# Patient Record
Sex: MALE | Race: WHITE | NOT HISPANIC OR LATINO | Employment: FULL TIME | URBAN - METROPOLITAN AREA
[De-identification: names, ages, dates, MRNs, and addresses within clinical notes are randomized per-mention and may not be internally consistent; named-entity substitution may affect disease eponyms.]

---

## 2017-08-24 ENCOUNTER — OFFICE VISIT (OUTPATIENT)
Dept: NEUROLOGY | Facility: CLINIC | Age: 52
End: 2017-08-24
Payer: COMMERCIAL

## 2017-08-24 VITALS
SYSTOLIC BLOOD PRESSURE: 131 MMHG | BODY MASS INDEX: 35.92 KG/M2 | DIASTOLIC BLOOD PRESSURE: 78 MMHG | WEIGHT: 237 LBS | HEART RATE: 74 BPM | HEIGHT: 68 IN

## 2017-08-24 DIAGNOSIS — G03.9 PACHYMENINGITIS: Primary | ICD-10-CM

## 2017-08-24 PROCEDURE — 99205 OFFICE O/P NEW HI 60 MIN: CPT | Mod: S$GLB,,, | Performed by: PSYCHIATRY & NEUROLOGY

## 2017-08-24 PROCEDURE — 99999 PR PBB SHADOW E&M-NEW PATIENT-LVL II: CPT | Mod: PBBFAC,,, | Performed by: PSYCHIATRY & NEUROLOGY

## 2017-08-24 PROCEDURE — 3008F BODY MASS INDEX DOCD: CPT | Mod: S$GLB,,, | Performed by: PSYCHIATRY & NEUROLOGY

## 2017-08-24 RX ORDER — GLYBURIDE-METFORMIN HYDROCHLORIDE 2.5; 5 MG/1; MG/1
TABLET ORAL
COMMUNITY
Start: 2017-07-03 | End: 2020-09-23

## 2017-08-24 RX ORDER — METOPROLOL TARTRATE 25 MG/1
25 TABLET, FILM COATED ORAL 2 TIMES DAILY
COMMUNITY
Start: 2017-08-09

## 2017-08-24 RX ORDER — ROSUVASTATIN CALCIUM 20 MG/1
20 TABLET, COATED ORAL NIGHTLY
COMMUNITY
Start: 2017-08-11

## 2017-08-24 RX ORDER — TESTOSTERONE CYPIONATE 200 MG/ML
200 INJECTION, SOLUTION INTRAMUSCULAR
COMMUNITY
End: 2021-10-14

## 2017-08-24 RX ORDER — WARFARIN SODIUM 5 MG/1
5 TABLET ORAL
COMMUNITY
Start: 2017-07-03

## 2017-08-24 RX ORDER — ASPIRIN 81 MG/1
81 TABLET ORAL DAILY
COMMUNITY

## 2017-08-24 RX ORDER — RAMIPRIL 1.25 MG/1
1.25 CAPSULE ORAL NIGHTLY
COMMUNITY
Start: 2017-08-08

## 2017-08-24 RX ORDER — GABAPENTIN 300 MG/1
600 CAPSULE ORAL NIGHTLY
COMMUNITY
Start: 2017-08-18

## 2017-08-24 RX ORDER — MONTELUKAST SODIUM 10 MG/1
10 TABLET ORAL DAILY
COMMUNITY
Start: 2017-08-04

## 2017-08-24 NOTE — LETTER
August 24, 2017      Brown Ochoa MD  [address]  [facility] [place] [address]           [facility]  [address]  [facility] [address]  Phone: [phone]  Fax: [phone]          Patient: Pedro Petty   MR Number: [id number]   YOB: 1965   Date of Visit: 8/24/2017       Dear Dr. Brown Ochoa:    Thank you for referring Pedro Petty to me for evaluation. Attached you will find relevant portions of my assessment and plan of care.    If you have questions, please do not hesitate to call me. I look forward to following Pedro Petty along with you.    Sincerely,    Ahsan Bowens MD    Enclosure  CC:  No Recipients    If you would like to receive this communication electronically, please contact [email or web address] or [phone] to request more information on [email or web address] Link access.    For providers and/or their staff who would like to refer a patient to [facility], please contact us through our one-stop-shop provider referral line, [facility], at [phone].    If you feel you have received this communication in error or would no longer like to receive these types of communications, please e-mail [email or web address]

## 2017-08-24 NOTE — PROGRESS NOTES
[facility] - NEUROLOGY  [facility]    Date: August 24, 2017   Patient Name: Pedro Petty   MRN: [id number]   PCP: Rafi Corado  Referring Provider: Brown Ochoa MD    Assessment:      This is Pedro Petty, 51 y.o. male on coumadin due to aortic valve replacement in 2010 with several days of now resolved headache and confusion during prolonged stay in [place] with MRI brain showing pachymeningeal enhancement.  Currently without deficit or complaint.  Diagnostic considerations include sarcoid, CNS lymphoma, leptomeningeal carcinomatosis, fungal or TB infection.  Needs LP for further evaluation.     Plan:      -  LP scheduled, advised to stop coumadin 5 days prior and resume after procedure  -  Labs today  -  Follow up when procedure complete       I discussed side effects of the medications. I asked the patient to stop the medication if he notices serious adverse effects as we discussed and to seek immediate medical attention at an ER.     Ahsan Bowens MD  [facility]   Department of Neurology    Subjective:      HPI:   Mr. Pedro Petty is a 51 y.o. 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  In mid July he noted mild headache and blurry vision for 5-6 days and was then noted to have mild confusion when calling his wife (did not recall phone number).  He was briefly hospitalized in [place] with abnormal CT head, BP noted to be 179/110 and discharged with instructions to follow up once home.  Since shortly after discharge from hospital in [place] he has felt well with no headaches, vision changes, weakness, confusions.  He has never smoked, does not get PPD for work.  Follows with Dr. Brown Ochoa for cardiology in [place].    PAST MEDICAL HISTORY:  No past medical history on file.    PAST SURGICAL HISTORY:  No past surgical history on file.    CURRENT  "MEDS:  Current Outpatient Prescriptions   Medication Sig Dispense Refill    aspirin (ECOTRIN) 81 MG EC tablet Take 81 mg by mouth once daily.      gabapentin (NEURONTIN) 300 MG capsule       glyBURIDE-metformin 2.5-500 mg (GLUCOVANCE) 2.5-500 mg per tablet       metoprolol tartrate (LOPRESSOR) 25 MG tablet       montelukast (SINGULAIR) 10 mg tablet       ramipril (ALTACE) 1.25 MG capsule       rosuvastatin (CRESTOR) 20 MG tablet       testosterone cypionate (DEPO-TESTOSTERONE) 200 mg/mL injection Inject 200 mg into the muscle every 14 (fourteen) days.      warfarin (COUMADIN) 5 MG tablet        No current facility-administered medications for this visit.        ALLERGIES:  Review of patient's allergies indicates:  No Known Allergies    FAMILY HISTORY:  No family history on file.    SOCIAL HISTORY:  Social History   Substance Use Topics    Smoking status: Never Smoker    Smokeless tobacco: Never Used    Alcohol use Not on file       Review of Systems:  12 review of systems is negative except for the symptoms mentioned in HPI.        Objective:     Vitals:    08/24/17 1024   BP: 131/78   Pulse: 74   Weight: 107.5 kg (236 lb 15.9 oz)   Height: 5' 8" (1.727 m)       General: NAD, well nourished   Eyes: no tearing, discharge, no erythema   ENT: moist mucous membranes of the oral cavity, nares patent    Neck: Supple, full range of motion  Cardiovascular: Warm and well perfused, pulses equal and symmetrical  Lungs: Normal work of breathing, normal chest wall excursions  Skin: No rash, lesions, or breakdown on exposed skin  Psychiatry: Mood and affect are appropriate   Abdomen: soft, non tender, non distended  Extremeties: No cyanosis, clubbing or edema.    Neurological   MENTAL STATUS: Alert and oriented to person, place, and time. Attention and concentration within normal limits. Speech without dysarthria, able to name and repeat without difficulty. Recent and remote memory within normal limits   CRANIAL " NERVES: Visual fields intact. PERRL. EOMI. Facial sensation intact. Face symmetrical. Hearing grossly intact. Full shoulder shrug bilaterally. Tongue protrudes midline   SENSORY: Sensation is intact to light touch throughout.  Negative Romberg.   MOTOR: Normal bulk and tone. No pronator drift.  5/5 deltoid, biceps, triceps, interosseous, hand  bilaterally. 5/5 iliopsoas, knee extension/flexion, foot dorsi/plantarflexion bilaterally.    REFLEXES: Ankles mute, remainder symmetric and 2+ throughout. Toes down going bilaterally.   CEREBELLAR/COORDINATION/GAIT: Gait steady with normal arm swing and stride length.  Heel to shin intact. Finger to nose intact. Normal rapid alternating movements.

## 2017-08-24 NOTE — LETTER
August 24, 2017    Pedro Petty  [address] [place] [address]             [facility]  [address]  [place] [address]  Phone: [phone]  Fax: [phone] To whom it may concern,    I saw Mr. Pedro Petty on August 23, 2017 in neurology clinic for evaluation regarding recent head imaging.  He will require additional testing which is scheduled for August 31, 2017.  He may return to work with full duties on September 4, 2017.  Please do not hesitate to contact me for any further information.    Thank you,        Ahsan Bowens M.D.

## 2017-08-25 ENCOUNTER — PATIENT MESSAGE (OUTPATIENT)
Dept: NEUROLOGY | Facility: CLINIC | Age: 52
End: 2017-08-25

## 2017-08-25 DIAGNOSIS — F41.9 ANXIETY: Primary | ICD-10-CM

## 2017-08-25 RX ORDER — CLONAZEPAM 1 MG/1
1 TABLET ORAL 2 TIMES DAILY PRN
Qty: 10 TABLET | Refills: 0 | Status: SHIPPED | OUTPATIENT
Start: 2017-08-25 | End: 2021-03-17

## 2017-08-31 ENCOUNTER — TELEPHONE (OUTPATIENT)
Dept: NEUROLOGY | Facility: CLINIC | Age: 52
End: 2017-08-31

## 2017-08-31 ENCOUNTER — HOSPITAL ENCOUNTER (OUTPATIENT)
Dept: RADIOLOGY | Facility: HOSPITAL | Age: 52
Discharge: HOME | End: 2017-08-31
Attending: PSYCHIATRY & NEUROLOGY
Payer: COMMERCIAL

## 2017-08-31 DIAGNOSIS — G03.9 PACHYMENINGITIS: ICD-10-CM

## 2017-08-31 LAB
CLARITY CSF: CLEAR
COLOR CSF: COLORLESS
GLUCOSE CSF-MCNC: 75 MG/DL
LYMPHOCYTES NFR CSF MANUAL: 74 %
MONOS+MACROS NFR CSF MANUAL: 23 %
NEUTROPHILS NFR CSF MANUAL: 3 %
PROT CSF-MCNC: 51 MG/DL
RBC # CSF: 50 /CU MM
SPECIMEN VOL CSF: 2 ML
WBC # CSF: 3 /CU MM

## 2017-08-31 PROCEDURE — 88185 FLOWCYTOMETRY/TC ADD-ON: CPT | Performed by: PATHOLOGY

## 2017-08-31 PROCEDURE — 86403 PARTICLE AGGLUT ANTBDY SCRN: CPT

## 2017-08-31 PROCEDURE — 87116 MYCOBACTERIA CULTURE: CPT

## 2017-08-31 PROCEDURE — 86592 SYPHILIS TEST NON-TREP QUAL: CPT

## 2017-08-31 PROCEDURE — 82945 GLUCOSE OTHER FLUID: CPT

## 2017-08-31 PROCEDURE — 87205 SMEAR GRAM STAIN: CPT

## 2017-08-31 PROCEDURE — 77003 FLUOROGUIDE FOR SPINE INJECT: CPT | Mod: 26,,, | Performed by: RADIOLOGY

## 2017-08-31 PROCEDURE — 87070 CULTURE OTHR SPECIMN AEROBIC: CPT

## 2017-08-31 PROCEDURE — 87015 SPECIMEN INFECT AGNT CONCNTJ: CPT

## 2017-08-31 PROCEDURE — 86738 MYCOPLASMA ANTIBODY: CPT

## 2017-08-31 PROCEDURE — 62270 DX LMBR SPI PNXR: CPT | Mod: ,,, | Performed by: RADIOLOGY

## 2017-08-31 PROCEDURE — 87102 FUNGUS ISOLATION CULTURE: CPT

## 2017-08-31 PROCEDURE — 89051 BODY FLUID CELL COUNT: CPT

## 2017-08-31 PROCEDURE — 88189 FLOWCYTOMETRY/READ 16 & >: CPT | Mod: ,,, | Performed by: PATHOLOGY

## 2017-08-31 PROCEDURE — 82164 ANGIOTENSIN I ENZYME TEST: CPT

## 2017-08-31 PROCEDURE — 86698 HISTOPLASMA ANTIBODY: CPT

## 2017-08-31 PROCEDURE — 84157 ASSAY OF PROTEIN OTHER: CPT

## 2017-08-31 PROCEDURE — 62270 DX LMBR SPI PNXR: CPT | Mod: TC

## 2017-08-31 PROCEDURE — 88184 FLOWCYTOMETRY/ TC 1 MARKER: CPT | Performed by: PATHOLOGY

## 2017-08-31 NOTE — TELEPHONE ENCOUNTER
Patient wife is  Concern about him going back to work on  Monday but is  Worried about not  Getting the  Full  Results in on time She  Ask for a call back  From  You  Because she would like to what  The next steps would be

## 2017-08-31 NOTE — PROCEDURES
Radiology Post-Procedure Note    Pre Op Diagnosis: HA and vertigo     Post Op Diagnosis: Same    Procedure: lumbar puncture    Procedure performed by: Ross NIETO, Rona Aguirre and Kari Landeros    Written Informed Consent Obtained: Yes    Specimen Removed: 9 mL CSF    Estimated Blood Loss: Minimal    Findings: Following written informed consent and sterile prep and drape, a 22 gauge spinal needle was inserted at L2 - L3 intralaminar space under fluoroscopic surveillance.  9 mL clear CSF removed and sent to the lab for further analysis.  There were no complications.    Patient tolerated procedure well.    Kari Landeros  Diagnostic and interventional radiology  PGY- II  [phone]

## 2017-09-01 ENCOUNTER — PATIENT MESSAGE (OUTPATIENT)
Dept: NEUROLOGY | Facility: CLINIC | Age: 52
End: 2017-09-01

## 2017-09-01 LAB
CRYPTOC AG CSF QL LA: NEGATIVE
FLOW CYTOMETRY ANTIBODIES ANALYZED - CSF: NORMAL
FLOW CYTOMETRY COMMENT - CSF: NORMAL
FLOW CYTOMETRY INTERPRETATION - CSF: NORMAL

## 2017-09-01 NOTE — TELEPHONE ENCOUNTER
----- Message from Ameya Carroll sent at 9/1/2017  4:50 PM CDT -----  Contact: Zenaida (wife) @ [phone]  Zenaida is calling to see if the doctor was able to write a letter releasing the pt back to work. 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

## 2017-09-02 LAB
H CAPSUL AB CSF QL ID: NEGATIVE
H CAPSUL MYC AB TITR CSF CF: NEGATIVE {TITER}
H CAPSUL YST AB TITR CSF CF: NEGATIVE {TITER}

## 2017-09-03 LAB — ACE CSF-CCNC: 0.8 U/L (ref 0–2.5)

## 2017-09-05 LAB
CMV SPEC QL SHELL VIAL CULT: NO GROWTH
GRAM STN SPEC: NORMAL

## 2017-09-06 LAB
Lab: NORMAL
VDRL CSF QL: NORMAL

## 2017-10-02 ENCOUNTER — OFFICE VISIT (OUTPATIENT)
Dept: NEUROLOGY | Facility: CLINIC | Age: 52
End: 2017-10-02
Payer: COMMERCIAL

## 2017-10-02 ENCOUNTER — PATIENT MESSAGE (OUTPATIENT)
Dept: NEUROLOGY | Facility: CLINIC | Age: 52
End: 2017-10-02

## 2017-10-02 VITALS
BODY MASS INDEX: 35.92 KG/M2 | SYSTOLIC BLOOD PRESSURE: 101 MMHG | HEART RATE: 62 BPM | DIASTOLIC BLOOD PRESSURE: 75 MMHG | HEIGHT: 68 IN | WEIGHT: 237 LBS

## 2017-10-02 DIAGNOSIS — G03.9 PACHYMENINGITIS: Primary | ICD-10-CM

## 2017-10-02 LAB — FUNGUS SPEC CULT: NORMAL

## 2017-10-02 PROCEDURE — 3008F BODY MASS INDEX DOCD: CPT | Mod: S$GLB,,, | Performed by: PSYCHIATRY & NEUROLOGY

## 2017-10-02 PROCEDURE — 99999 PR PBB SHADOW E&M-EST. PATIENT-LVL III: CPT | Mod: PBBFAC,,, | Performed by: PSYCHIATRY & NEUROLOGY

## 2017-10-02 PROCEDURE — 99213 OFFICE O/P EST LOW 20 MIN: CPT | Mod: S$GLB,,, | Performed by: PSYCHIATRY & NEUROLOGY

## 2017-10-02 NOTE — PROGRESS NOTES
[facility] - NEUROLOGY  [facility]    Date: October 2, 2017   Patient Name: Pedro Petty   MRN: [id number]   PCP: Rafi Corado  Referring Provider: Rafi Harris.,*    Assessment:      This is Pedro Petty, 51 y.o. male on coumadin due to aortic valve replacement in 2010 with several days of now resolved headache and briefconfusion during prolonged stay in [place] with MRI brain showing pachymeningeal enhancement, CSF without abnormalities to explain this.  He will need repeat imaging to determine if there is continuing enhancement.     Plan:      -  MRI brain with and without       I discussed side effects of the medications. I asked the patient to stop the medication if he notices serious adverse effects as we discussed and to seek immediate medical attention at an ER.     Ahsan Bowens MD  [facility]   Department of Neurology    Subjective:   Patient continues to feel well, returned to [place] for work without any further headache, confusion    HPI 8/2017:   Mr. Pedro Petty is a 51 y.o. 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  In mid July he noted mild headache and blurry vision for 5-6 days and was then noted to have mild confusion when calling his wife (did not recall phone number).  He was briefly hospitalized in [place] with abnormal CT head, BP noted to be 179/110 and discharged with instructions to follow up once home.  Since shortly after discharge from hospital in [place] he has felt well with no headaches, vision changes, weakness, confusions.  He has never smoked, does not get PPD for work.  Follows with Dr. Brown Ochoa for cardiology in [place].    PAST MEDICAL HISTORY:  No past medical history on file.    PAST SURGICAL HISTORY:  No past surgical history on file.    CURRENT MEDS:  Current Outpatient Prescriptions  "  Medication Sig Dispense Refill    aspirin (ECOTRIN) 81 MG EC tablet Take 81 mg by mouth once daily.      clonazePAM (KLONOPIN) 1 MG tablet Take 1 tablet (1 mg total) by mouth 2 (two) times daily as needed for Anxiety. 10 tablet 0    gabapentin (NEURONTIN) 300 MG capsule       glyBURIDE-metformin 2.5-500 mg (GLUCOVANCE) 2.5-500 mg per tablet       metoprolol tartrate (LOPRESSOR) 25 MG tablet       montelukast (SINGULAIR) 10 mg tablet       ramipril (ALTACE) 1.25 MG capsule       rosuvastatin (CRESTOR) 20 MG tablet       testosterone cypionate (DEPO-TESTOSTERONE) 200 mg/mL injection Inject 200 mg into the muscle every 14 (fourteen) days.      warfarin (COUMADIN) 5 MG tablet        No current facility-administered medications for this visit.        ALLERGIES:  Review of patient's allergies indicates:  No Known Allergies    FAMILY HISTORY:  No family history on file.    SOCIAL HISTORY:  Social History   Substance Use Topics    Smoking status: Never Smoker    Smokeless tobacco: Never Used    Alcohol use Not on file       Review of Systems:  12 review of systems is negative except for the symptoms mentioned in HPI.        Objective:     Vitals:    10/02/17 1033   BP: 101/75   Pulse: 62   Weight: 107.5 kg (236 lb 15.9 oz)   Height: 5' 8" (1.727 m)       General: NAD, well nourished   Eyes: no tearing, discharge, no erythema   ENT: moist mucous membranes of the oral cavity, nares patent    Neck: Supple, full range of motion  Cardiovascular: Warm and well perfused, pulses equal and symmetrical  Lungs: Normal work of breathing, normal chest wall excursions  Skin: No rash, lesions, or breakdown on exposed skin  Psychiatry: Mood and affect are appropriate   Abdomen: soft, non tender, non distended  Extremeties: No cyanosis, clubbing or edema.    Neurological   MENTAL STATUS: Alert and oriented to person, place, and time. Attention and concentration within normal limits. Speech without dysarthria, able to name and " repeat without difficulty. Recent and remote memory within normal limits   CRANIAL NERVES: Visual fields intact. PERRL. EOMI. Facial sensation intact. Face symmetrical. Hearing grossly intact. Full shoulder shrug bilaterally. Tongue protrudes midline   SENSORY: Sensation is intact to light touch throughout.  Negative Romberg.   MOTOR: Normal bulk and tone. No pronator drift.  5/5 deltoid, biceps, triceps, interosseous, hand  bilaterally. 5/5 iliopsoas, knee extension/flexion, foot dorsi/plantarflexion bilaterally.    REFLEXES: Ankles mute, remainder symmetric and 2+ throughout.  CEREBELLAR/COORDINATION/GAIT: Gait steady with normal arm swing and stride length.  Heel to shin intact. Finger to nose intact. Normal rapid alternating movements.

## 2017-10-02 NOTE — LETTER
October 2, 2017      Rafi Harris MD  [address] [facility] [address]           [facility] Neurology  [address]  [facility] [address]  Phone: [phone]  Fax: [phone]          Patient: Pedro Petty   MR Number: [id number]   YOB: 1965   Date of Visit: 10/2/2017       Dear Dr. Rafi Harris:    Thank you for referring Pedro Petty to me for evaluation. Attached you will find relevant portions of my assessment and plan of care.    If you have questions, please do not hesitate to call me. I look forward to following Pedro Petty along with you.    Sincerely,    Ahsan Bowens MD    Enclosure  CC:  No Recipients    If you would like to receive this communication electronically, please contact [email or web address] or [phone] to request more information on [employer] Link access.    For providers and/or their staff who would like to refer a patient to [facility], please contact us through our one-stop-shop provider referral line, [facility], at [phone].    If you feel you have received this communication in error or would no longer like to receive these types of communications, please e-mail [email or web address]

## 2017-10-04 ENCOUNTER — PATIENT MESSAGE (OUTPATIENT)
Dept: NEUROLOGY | Facility: CLINIC | Age: 52
End: 2017-10-04

## 2017-10-05 DIAGNOSIS — G03.9 PACHYMENINGITIS: Primary | ICD-10-CM

## 2017-10-06 ENCOUNTER — OFFICE VISIT (OUTPATIENT)
Dept: NEUROSURGERY | Facility: CLINIC | Age: 52
End: 2017-10-06
Payer: COMMERCIAL

## 2017-10-06 DIAGNOSIS — R20.9 CVA, OLD, ALTERATIONS OF SENSATIONS: Primary | ICD-10-CM

## 2017-10-06 DIAGNOSIS — I69.398 CVA, OLD, ALTERATIONS OF SENSATIONS: Primary | ICD-10-CM

## 2017-10-06 PROCEDURE — 99204 OFFICE O/P NEW MOD 45 MIN: CPT | Mod: S$GLB,,, | Performed by: NEUROLOGICAL SURGERY

## 2017-10-06 PROCEDURE — 99999 PR PBB SHADOW E&M-EST. PATIENT-LVL II: CPT | Mod: PBBFAC,,, | Performed by: NEUROLOGICAL SURGERY

## 2017-10-06 NOTE — LETTER
October 9, 2017      Ahsan Bowens MD  [address] [facility] [address]           [facility]  [address]  [place] [address]  Phone: [phone]          Patient: Pedro Petty   MR Number: [id number]   YOB: 1965   Date of Visit: 10/6/2017       Dear Dr. Ahsan Bowens:    Thank you for referring Pedro Petty to me for evaluation. Attached you will find relevant portions of my assessment and plan of care.    If you have questions, please do not hesitate to call me. I look forward to following Pedro Petty along with you.    Sincerely,    Connor Patel MD    Enclosure  CC:  No Recipients    If you would like to receive this communication electronically, please contact [email or web address] or [phone] to request more information on [employer] Link access.    For providers and/or their staff who would like to refer a patient to [facility], please contact us through our one-stop-shop provider referral line, [facility] , at [phone].    If you feel you have received this communication in error or would no longer like to receive these types of communications, please e-mail [email or web address]

## 2017-10-06 NOTE — PATIENT INSTRUCTIONS
I have reviewed the patient's MRI of brain, which shows a gradual shrunken gyrus secondary to chronic changes. 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 I do not recommend surgery. If the patient experiences any new symptoms/ complications the patient should contact us.

## 2017-10-06 NOTE — PROGRESS NOTES
Subjective:    I, Christina Tolbert, am scribing for, and in the presence of, Dr. Connor Patel.     Patient ID: Pedro Petty is a 51 y.o. male.    Chief Complaint: No chief complaint on file.    HPI   Pt is a 52 yo male who presents today for consultation. Pt was referred by Dr. Ahsan Bowens, Neurology. 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 Pt was hospitalized in [place] with abnormal CT head, BP noted to be 179/110 and discharged with instructions to follow up once home. Pt denies since episode in [place], he denies headaches, vision changes, weakness, and confusion.    On 10/2/2017, pt FU with Dr. Bowens for headaches and blurry vision for 5-6 days with mild confusion in mid-July 2017 while in [place] at work.    Pt has surgical hx of a bypass.     Review of Systems   Constitutional: Negative for chills and fever.   HENT: Negative.    Eyes: Negative.    Respiratory: Negative.    Cardiovascular: Negative.    Gastrointestinal: Negative.    Endocrine: Negative.    Genitourinary: Negative.    Musculoskeletal: Negative.    Skin: Negative.    Allergic/Immunologic: Negative.    Neurological: Negative for tremors, weakness, light-headedness, numbness and headaches.   Hematological: Negative.    Psychiatric/Behavioral: Negative.        No past medical history on file.    Objective:       There were no vitals taken for this visit.    Physical Exam   Constitutional: He is oriented to person, place, and time. He appears well-developed and well-nourished.   Eyes: Pupils are equal, round, and reactive to light.   Pulmonary/Chest: Effort normal.   Musculoskeletal: He exhibits no edema.   Neurological: He is alert and oriented to person, place, and time. No cranial nerve deficit.   Skin: Skin is warm and dry.   Psychiatric: He has a normal mood and affect.        Imaging:  MRI Brain W WO Contrast 10/4/2017 shows a gradual shrunken gyrus secondary to chronic changes.    I have personally reviewed the images with the pt.      I, Dr. Connor Patel, personally performed the services described in this documentation as scribed by Christina Tolbert in my presence, and it is both accurate and complete.    Assessment:       Remote stroke.    Plan:   I have reviewed the patient's MRI of brain, which shows a gradual shrunken gyrus secondary to chronic changes. 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 I do not recommend surgery. If the patient experiences any new symptoms/ complications the patient should contact us.

## 2017-11-03 LAB
ACID FAST MOD KINY STN SPEC: NORMAL
MYCOBACTERIUM SPEC QL CULT: NORMAL

## 2021-04-22 PROBLEM — Z71.2 ENCOUNTER TO DISCUSS TEST RESULTS: Status: ACTIVE | Noted: 2021-04-22

## 2021-04-22 PROBLEM — D69.6 THROMBOCYTOPENIA: Status: ACTIVE | Noted: 2021-04-22

## 2021-05-21 PROBLEM — R74.02 ELEVATED LDH: Status: ACTIVE | Noted: 2021-05-21

## 2021-05-21 PROBLEM — R77.8 ABNORMAL SPEP: Status: ACTIVE | Noted: 2021-05-21

## 2021-05-21 PROBLEM — D50.9 IRON DEFICIENCY ANEMIA: Status: ACTIVE | Noted: 2021-05-21

## 2021-10-14 PROBLEM — R77.8 ABNORMAL SPEP: Status: RESOLVED | Noted: 2021-05-21 | Resolved: 2021-10-14

## 2021-10-14 PROBLEM — R16.1 SPLENOMEGALY: Status: ACTIVE | Noted: 2021-10-14

## 2021-10-14 PROBLEM — K74.60 LIVER CIRRHOSIS: Status: ACTIVE | Noted: 2021-10-14

## 2023-06-19 PROBLEM — Z71.9 ENCOUNTER FOR EDUCATION: Status: ACTIVE | Noted: 2023-06-19

## 2023-06-19 PROBLEM — Z71.89 ENCOUNTER TO DISCUSS TREATMENT OPTIONS: Status: ACTIVE | Noted: 2023-06-19

## 2024-04-05 ENCOUNTER — TELEPHONE (OUTPATIENT)
Dept: TRANSPLANT | Facility: CLINIC | Age: 59
End: 2024-04-05
Payer: COMMERCIAL

## 2024-04-05 NOTE — TELEPHONE ENCOUNTER
----- Message from Aixa Dalton sent at 4/5/2024  3:33 PM CDT -----    Have medical records that where scanned into media from [facility]. Will call referring MD office if we need any additional information on the pt.    Referring Provider:Tyron Villavicencio Jr., MD  Phone: [phone]  Fax: [phone]  .  ===========================================================================    ----- Message -----   From: Fabienne Coulter   Sent: 4/5/2024   3:12 PM CDT   To: [facility] Hepat Clinical Staff; #     4/5/24     Referral scanned into media mgr, gilmar Cirrohosis- phone .[phone] (home) [phone] (work)         Thanks     Fabienne     CC

## 2024-04-09 ENCOUNTER — TELEPHONE (OUTPATIENT)
Dept: TRANSPLANT | Facility: CLINIC | Age: 59
End: 2024-04-09
Payer: COMMERCIAL

## 2024-04-09 ENCOUNTER — TELEPHONE (OUTPATIENT)
Dept: HEPATOLOGY | Facility: CLINIC | Age: 59
End: 2024-04-09
Payer: COMMERCIAL

## 2024-04-09 NOTE — TELEPHONE ENCOUNTER
The patient was contacted to schedule an appointment from referral.  No answer, an voicemail was left with call back .

## 2024-04-09 NOTE — LETTER
April 9, 2024    Pedro Petty  [address] [place] [address]      Dear Pedro Petty:    Your doctor has referred you to the [facility]. We are sending this letter to remind you to make an appointment with us to complete the referral process.     Please call us at [phone] to schedule an appointment. We look forward to seeing you soon.     If you received a call and have been scheduled, please disregard this letter.       Sincerely,        [facility]   [facility] [facility] [place] [address]  [phone]

## 2024-04-09 NOTE — TELEPHONE ENCOUNTER
----- Message from Aixa Dalton sent at 4/5/2024  3:33 PM CDT -----    Have medical records that where scanned into media from [facility]. Will call referring MD office if we need any additional information on the pt.    Referring Provider:Tyron Villavicencio Jr., MD  Phone: [phone]  Fax: [phone]  .  ===========================================================================    ----- Message -----   From: Fabienne Coulter   Sent: 4/5/2024   3:12 PM CDT   To: [facility] Hepat Clinical Staff; #     4/5/24     Referral scanned into media mgr, gilmar Cirrohosis- phone .[phone] (home) [phone] (work)         Thanks     Fabienne     CC

## 2024-04-09 NOTE — LETTER
April 9, 2024    Tyron Villavicencio Jr., MD  [address] [facility] [address]      Dear Dr. Villavicencio    Patient: Pedro Petty   MR Number: [id number]   YOB: 1965     Thank you for the referral of Pedro Petty to the [facility]. An initial appointment will be scheduled for your patient with one of our Hepatologists.      Thank you again for your trust in our program.  If there is anything we can do for you or your staff, please feel free to contact us.        Sincerely,        [facility]  [address] [place] [id number]  [phone]

## 2024-04-10 NOTE — TELEPHONE ENCOUNTER
Called the patient to schedule a hepatology consult from referral.  No answer, left voicemail with call back # [phone].

## 2024-04-12 NOTE — TELEPHONE ENCOUNTER
Called the patient to schedule a hepatology consult from referral.  Spoke with Ms. Estevez (pt's Wife). Scheduled to the next available appt 6/11/2024 with DOMENIC Rodriguez. Ms. Estevez confirmed and agreed with the schedule.  Reminder letter mailed.

## 2024-06-10 ENCOUNTER — TELEPHONE (OUTPATIENT)
Dept: HEPATOLOGY | Facility: CLINIC | Age: 59
End: 2024-06-10
Payer: COMMERCIAL

## 2024-06-10 NOTE — TELEPHONE ENCOUNTER
Reached out to pt in regards to r/s to VV per provider request. Pt VU and appt changed to [facility]V. All questions answered

## 2024-06-11 ENCOUNTER — OFFICE VISIT (OUTPATIENT)
Dept: HEPATOLOGY | Facility: CLINIC | Age: 59
End: 2024-06-11
Payer: COMMERCIAL

## 2024-06-11 ENCOUNTER — PATIENT MESSAGE (OUTPATIENT)
Dept: HEPATOLOGY | Facility: CLINIC | Age: 59
End: 2024-06-11

## 2024-06-11 DIAGNOSIS — K74.60 LIVER CIRRHOSIS SECONDARY TO NASH: Primary | ICD-10-CM

## 2024-06-11 DIAGNOSIS — K75.81 LIVER CIRRHOSIS SECONDARY TO NASH: Primary | ICD-10-CM

## 2024-06-11 DIAGNOSIS — I85.10 SECONDARY ESOPHAGEAL VARICES WITHOUT BLEEDING: ICD-10-CM

## 2024-06-11 PROCEDURE — 1160F RVW MEDS BY RX/DR IN RCRD: CPT | Mod: CPTII,95,, | Performed by: STUDENT IN AN ORGANIZED HEALTH CARE EDUCATION/TRAINING PROGRAM

## 2024-06-11 PROCEDURE — 4010F ACE/ARB THERAPY RXD/TAKEN: CPT | Mod: CPTII,95,, | Performed by: STUDENT IN AN ORGANIZED HEALTH CARE EDUCATION/TRAINING PROGRAM

## 2024-06-11 PROCEDURE — 99203 OFFICE O/P NEW LOW 30 MIN: CPT | Mod: 95,,, | Performed by: STUDENT IN AN ORGANIZED HEALTH CARE EDUCATION/TRAINING PROGRAM

## 2024-06-11 PROCEDURE — 1159F MED LIST DOCD IN RCRD: CPT | Mod: CPTII,95,, | Performed by: STUDENT IN AN ORGANIZED HEALTH CARE EDUCATION/TRAINING PROGRAM

## 2024-06-11 NOTE — Clinical Note
Labs 1-2 weeks. Ok to add to previously scheduled labs in July if he prefers. US 3 months. Return 9 months with labs and US.

## 2024-06-14 ENCOUNTER — TELEPHONE (OUTPATIENT)
Dept: HEPATOLOGY | Facility: CLINIC | Age: 59
End: 2024-06-14
Payer: COMMERCIAL

## 2024-06-14 NOTE — PROGRESS NOTES
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     Each patient to whom he or she provides medical services by telemedicine is:  (1) informed of the relationship between the physician and patient and the respective role of any other health care provider with respect to management of the patient; and (2) notified that he or she may decline to receive medical services by telemedicine and may withdraw from such care at any time.    Referring provider: Dr. Tyron Villavicencio Jr.  PCP: Rafi Harris MD    Chief complaint: [facility] cirrhosis    HPI:  Pedro Petty is a 58 y.o. male who was referred to Hepatology Clinic for MASH cirrhosis.    He reports being told that he had a fatty liver 3-4 years ago and was told that he had cirrhosis 7-8 months ago.  He did have a FibroScan in June 2021 that suggested cirrhosis.    He rarely drinks alcohol and has never been a heavy drinker.  Previous testing negative for chronic viral hepatitis. No known family history of liver disease. He denies signs of decompensated cirrhosis including no recent abdominal distension, encephalopathy, jaundice, GI bleeding.    Past Medical History:   Diagnosis Date    Anemia     Diabetes mellitus, type 2     Heart abnormality 2010    [facility]    Hypertension        Past Surgical History:   Procedure Laterality Date    back injection       CARDIAC VALVE SURGERY  2010       Family History   Problem Relation Name Age of Onset    Hypertension Mother      Diabetes Father      Cancer Father      No Known Problems Brother         Social History     Tobacco Use    Smoking status: Never    Smokeless tobacco: Never   Substance Use Topics    Drug use: Never       Current Outpatient Medications   Medication Sig Dispense Refill    aspirin (ECOTRIN) 81 MG EC tablet Take 81 mg by mouth once daily.      colchicine (COLCRYS) 0.6 mg tablet Take 0.6 mg by mouth every 6 (six) hours as needed.      gabapentin (NEURONTIN) 300 MG capsule Take 600 mg by mouth every evening.      metoprolol tartrate (LOPRESSOR) 25 MG tablet Take 25 mg by mouth 2 (two) times daily.       montelukast (SINGULAIR) 10 mg tablet Take 10 mg by mouth once daily.       MOUNJARO 7.5 mg/0.5 mL PnIj Inject 7.5 mg into the skin every 7 days. 12 pen 3    multivitamin capsule Take 1 capsule by mouth once daily.      ramipril (ALTACE) 1.25 MG capsule Take 1.25 mg by mouth every evening.       rosuvastatin (CRESTOR) 20 MG tablet Take 20 mg by mouth every evening.       SYNJARDY XR 12.5-1,000 mg TBph TAKE 1 TABLET BY MOUTH TWICE DAILY 180 tablet 3    valACYclovir (VALTREX) 1000 MG tablet Take 1,000 mg by mouth 3 (three) times daily.      warfarin (COUMADIN) 5 MG tablet Take 5 mg by mouth. Pt taking 7.5 mg on Wednesday and saturday all other days he takes 5 mg       No current facility-administered medications for this visit.       Review of patient's allergies indicates:  No Known Allergies    Review of Systems   Constitutional:  Negative for fever and weight loss.   Cardiovascular:  Negative for leg swelling.   Gastrointestinal:  Negative for abdominal pain, blood in stool, constipation, diarrhea, heartburn, melena, nausea and vomiting.       There were no vitals filed for this visit.    Physical Exam  Constitutional:       General: He is not in acute distress.     Appearance: He is not ill-appearing.    HENT:      Head: Normocephalic and atraumatic.   Eyes:      General: No scleral icterus.     Extraocular Movements: Extraocular movements intact.   Pulmonary:      Effort: No respiratory distress.   Skin:     Coloration: Skin is not jaundiced.   Neurological:      Mental Status: He is alert.         LABS: I personally reviewed pertinent laboratory findings.    Lab Results   Component Value Date    WBC 4.50 12/12/2023    HGB 14.5 12/12/2023    HCT 43.5 12/12/2023    MCV 94 12/12/2023    PLT 80 (L) 12/12/2023       Lab Results   Component Value Date     12/12/2023    K 4.3 12/12/2023     12/12/2023    CO2 25 12/12/2023    BUN 17 12/12/2023    CREATININE 0.71 (L) 12/12/2023    CALCIUM 9.6 12/12/2023    ANIONGAP 10 12/12/2023    ESTGFRAFRICA >60 07/26/2022    EGFRNONAA >60 07/26/2022       Lab Results   Component Value Date    ALT 47 (H) 12/12/2023    AST 53 12/12/2023    ALKPHOS 77 12/12/2023    BILITOT 1.0 12/12/2023       Lab Results   Component Value Date    HAV Reactive (A) 04/22/2021    HEPBCAB Non-reactive 04/22/2021    HEPCAB Non-reactive 04/22/2021       Lab Results   Component Value Date    SUPRIYA NEGATIVE 08/20/2021    MITOAB NEGATIVE 08/20/2021        Computed MELD 3.0 unavailable. One or more values for this score either were not found within the given timeframe or did not fit some other criterion.  Computed MELD-Na unavailable. One or more values for this score either were not found within the given timeframe or did not fit some other criterion.       IMAGING: I personally reviewed imaging studies.      Assessment:  58 y.o. male with MASH related cirrhosis. He has no decompensating features.    1. 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 Secondary esophageal varices without bleeding        Recommendations:  Cirrhosis due to MASH:  Complete serologic evaluation to help rule out other etiologies of liver disease. Counseled on diet, weight loss, and control of co-morbidities. Encouraged to lose 10%  of his body weight over the next 12 months.    Ascites/Edema: Not an active issue    Encephalopathy: Not an active issue    Transplant candidacy:  Suspect MELD will be too low to warrant transplant evaluation.    Cirrhosis HM  - HCC screening: US in 03/2024 without HCC. Check AFP. Repeat abdominal US and AFP every 6 months.    - Variceal screening: EGD in 06/2024 showed small varices. Continue surveillance EGDs with Dr. Villavicencio.    - Immunizations: Checking HAV and HBV serologies. Recommend vaccinations if not immune.    US 3 months. Return to clinic in 9 months with labs and US.    I have sent communication to the referring physician and/or primary care provider.    This note includes dictation done using [employer] speech recognition program. Word recognition mistakes are occasionally missed on review.    Dariel Rodriguez MD  Staff Physician  Hepatology and Liver Transplant  [facility]  [facility]

## 2024-06-14 NOTE — TELEPHONE ENCOUNTER
Reached out to pt in regards to scheduling. S/w pts wife. Wife [place]. Wife stated that the pt has labs already scheduled and an u/s scheduled that needs to be r/s. Attempted to scheduled the u/s in 3 months but unable to, seems as thought he facility has their schedule blocked. Wife stated she will contact their office and get the pts u/s r/s. Vu and no further questions.    ----- Message from Dariel Rodriguez MD sent at 6/14/2024  9:57 AM CDT -----  Labs 1-2 weeks. Ok to add to previously scheduled labs in July if he prefers. US 3 months. Return 9 months with labs and US.

## 2024-06-26 ENCOUNTER — PATIENT MESSAGE (OUTPATIENT)
Dept: HEPATOLOGY | Facility: CLINIC | Age: 59
End: 2024-06-26
Payer: COMMERCIAL

## 2024-09-10 ENCOUNTER — PATIENT MESSAGE (OUTPATIENT)
Dept: HEPATOLOGY | Facility: CLINIC | Age: 59
End: 2024-09-10
Payer: COMMERCIAL

## 2024-09-19 ENCOUNTER — HOSPITAL ENCOUNTER (OUTPATIENT)
Dept: RADIOLOGY | Facility: HOSPITAL | Age: 59
Discharge: HOME | End: 2024-09-19
Attending: STUDENT IN AN ORGANIZED HEALTH CARE EDUCATION/TRAINING PROGRAM
Payer: COMMERCIAL

## 2024-09-19 DIAGNOSIS — K75.81 LIVER CIRRHOSIS SECONDARY TO NASH: ICD-10-CM

## 2024-09-19 DIAGNOSIS — K74.60 LIVER CIRRHOSIS SECONDARY TO NASH: ICD-10-CM

## 2024-09-19 PROCEDURE — 76705 ECHO EXAM OF ABDOMEN: CPT | Mod: 26,,, | Performed by: RADIOLOGY

## 2024-09-19 PROCEDURE — 76705 ECHO EXAM OF ABDOMEN: CPT | Mod: TC

## 2024-10-02 ENCOUNTER — TELEPHONE (OUTPATIENT)
Dept: HEPATOLOGY | Facility: CLINIC | Age: 59
End: 2024-10-02
Payer: COMMERCIAL

## 2024-10-02 NOTE — TELEPHONE ENCOUNTER
Results letter sent  ----- Message from Dariel Rodriguez MD sent at 10/2/2024 10:54 AM CDT -----  Mr. Petty did not view portal message about results. Please let him know that his ultrasound showed no signs of liver cancer.

## 2025-08-21 ENCOUNTER — PATIENT MESSAGE (OUTPATIENT)
Dept: HEPATOLOGY | Facility: CLINIC | Age: 60
End: 2025-08-21
Payer: COMMERCIAL